# Patient Record
Sex: FEMALE | Race: WHITE | Employment: UNEMPLOYED
[De-identification: names, ages, dates, MRNs, and addresses within clinical notes are randomized per-mention and may not be internally consistent; named-entity substitution may affect disease eponyms.]

---
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
## 2023-01-03 ENCOUNTER — NURSE TRIAGE (OUTPATIENT)
Dept: OTHER | Facility: CLINIC | Age: 18
End: 2023-01-03

## 2023-01-03 NOTE — TELEPHONE ENCOUNTER
Location of patient: [place]    Subjective: Caller states \"Tachycardia\"     Current Symptoms: Tachycardia;  Blood pressure drops when she stands up;  Gets dizzy when standing up and has to hold a wall;  Nausea and vomiting for 6 months;  Denies blood; Chest pain for 6 years; Onset: 1 month ago;     Associated Symptoms: reduced activity    Pain Severity: 5/10; chronic joint pain;     Temperature: denies     What has been tried: Was having a physical today and was told that she needs to be seen in ED; Signed out AMA because she did not want to go in an ambulance; Recommended disposition: Go to ED Now    Care advice provided, patient verbalizes understanding; denies any other questions or concerns; instructed to call back for any new or worsening symptoms. Patient/caller agrees to proceed to the Emergency Department    This triage is a result of a call to [address] [place] [address]. Please do not respond to the triage nurse through this encounter. Any subsequent communication should be directly with the patient.     Reason for Disposition   Dizziness, lightheadedness, or weakness    Protocols used: Heart Rate and Heartbeat Questions-ADULT-[place]

## 2023-03-30 ENCOUNTER — TELEPHONE (OUTPATIENT)
Dept: CARDIOLOGY CLINIC | Age: 18
End: 2023-03-30

## 2023-03-30 NOTE — TELEPHONE ENCOUNTER
May be seen with any general cards if there is sooner.  If not please place patient on cancellation list as there is nothing sooner at this time

## 2023-03-30 NOTE — TELEPHONE ENCOUNTER
Patient was referred by Self to the [facility] location with Dr. Yuan Yip. Patient has been scheduled for 5/12/23 at 3 PM (am/pm). Patient verbalized understanding of appointment instructions. Call complete. Pt asking if they could be seen sooner. Getting second opinion on her Tach with Chest pain.

## 2023-03-31 NOTE — TELEPHONE ENCOUNTER
Pt is rs with [facility] ox 4/14 10:00 am Myalgia Monitoring: I explained this is common when taking isotretinoin. If this worsens they will contact us.

## 2023-04-14 ENCOUNTER — OFFICE VISIT (OUTPATIENT)
Dept: CARDIOLOGY CLINIC | Age: 18
End: 2023-04-14

## 2023-04-14 VITALS
OXYGEN SATURATION: 99 % | HEIGHT: 67 IN | WEIGHT: 204 LBS | SYSTOLIC BLOOD PRESSURE: 116 MMHG | BODY MASS INDEX: 32.02 KG/M2 | HEART RATE: 78 BPM | DIASTOLIC BLOOD PRESSURE: 70 MMHG

## 2023-04-14 DIAGNOSIS — R00.0 TACHYCARDIA: Primary | ICD-10-CM

## 2023-04-14 DIAGNOSIS — R00.2 PALPITATIONS: ICD-10-CM

## 2023-04-14 RX ORDER — VITAMIN B COMPLEX
1 CAPSULE ORAL DAILY
COMMUNITY

## 2023-04-14 RX ORDER — ALBUTEROL SULFATE 90 UG/1
2 AEROSOL, METERED RESPIRATORY (INHALATION) EVERY 6 HOURS PRN
COMMUNITY

## 2023-04-14 RX ORDER — DOXEPIN HYDROCHLORIDE 6 MG/1
TABLET ORAL
COMMUNITY
Start: 2023-03-15

## 2023-04-14 RX ORDER — MELOXICAM 15 MG/1
15 TABLET ORAL DAILY
COMMUNITY
Start: 2023-04-06

## 2023-04-14 RX ORDER — DICYCLOMINE HCL 20 MG
TABLET ORAL
COMMUNITY
Start: 2023-04-04

## 2023-04-14 RX ORDER — CARIPRAZINE 1.5 MG/1
CAPSULE, GELATIN COATED ORAL
COMMUNITY
Start: 2023-04-06

## 2023-04-14 RX ORDER — IPRATROPIUM BROMIDE AND ALBUTEROL SULFATE 2.5; .5 MG/3ML; MG/3ML
SOLUTION RESPIRATORY (INHALATION)
COMMUNITY
Start: 2023-03-17

## 2023-04-14 RX ORDER — PROPRANOLOL HYDROCHLORIDE 10 MG/1
TABLET ORAL
COMMUNITY
Start: 2023-04-04

## 2023-04-14 RX ORDER — UBIDECARENONE 50 MG
100 CAPSULE ORAL DAILY
COMMUNITY

## 2023-04-14 RX ORDER — DEXTROMETHORPHAN HYDROBROMIDE AND PROMETHAZINE HYDROCHLORIDE 15; 6.25 MG/5ML; MG/5ML
SYRUP ORAL
COMMUNITY
Start: 2023-04-04

## 2023-04-17 ENCOUNTER — TELEPHONE (OUTPATIENT)
Dept: CARDIOLOGY CLINIC | Age: 18
End: 2023-04-17

## 2023-04-17 NOTE — TELEPHONE ENCOUNTER
Pt called to request her test that were done at [facility]. Please call to discuss. Please advise     Pt is requesting a referral for an endocrinologist.  Please advise.   Thank you

## 2023-04-17 NOTE — TELEPHONE ENCOUNTER
I spoke w/ Michael Westgate and advised her that the allergist will have to make the referral. She states she is not seeing the allergist until later this month. Her pcp can make the referral, but she states he needs to know why the referral is being made. She states Jay Ward is requesting it be in  [employer] office note. I informed Oumar Yeung will notify Dr Teo Pearl of this.

## 2023-04-25 ENCOUNTER — TELEPHONE (OUTPATIENT)
Dept: CARDIOLOGY CLINIC | Age: 18
End: 2023-04-25

## 2023-05-02 NOTE — TELEPHONE ENCOUNTER
Pt called for lab results. Would like a call back to discuss and to also schedule a Follow up appt with DA. Please advise.

## 2023-12-11 ENCOUNTER — APPOINTMENT (OUTPATIENT)
Dept: OBSTETRICS AND GYNECOLOGY | Facility: CLINIC | Age: 18
End: 2023-12-11
Payer: COMMERCIAL

## 2024-06-13 ENCOUNTER — TELEPHONE (OUTPATIENT)
Dept: OBSTETRICS AND GYNECOLOGY | Facility: CLINIC | Age: 19
End: 2024-06-13
Payer: COMMERCIAL

## 2024-06-13 DIAGNOSIS — N91.2 AMENORRHEA: Primary | ICD-10-CM

## 2024-06-13 NOTE — TELEPHONE ENCOUNTER
Pt called to scheduled appt. Pt is unsure when her lmp was and has taken multiple pregnancy tests that have all came out negative. Pt needs orders for blood work and has appt set for 06/27

## 2024-06-14 ENCOUNTER — LAB (OUTPATIENT)
Dept: LAB | Facility: LAB | Age: 19
End: 2024-06-14
Payer: COMMERCIAL

## 2024-06-14 DIAGNOSIS — N91.2 AMENORRHEA: ICD-10-CM

## 2024-06-14 LAB — B-HCG SERPL-ACNC: <3 MIU/ML

## 2024-06-14 PROCEDURE — 84702 CHORIONIC GONADOTROPIN TEST: CPT

## 2024-06-14 PROCEDURE — 36415 COLL VENOUS BLD VENIPUNCTURE: CPT

## 2024-06-26 ENCOUNTER — TELEPHONE (OUTPATIENT)
Dept: OBSTETRICS AND GYNECOLOGY | Facility: CLINIC | Age: 19
End: 2024-06-26
Payer: COMMERCIAL

## 2024-06-27 ENCOUNTER — HOSPITAL ENCOUNTER (OUTPATIENT)
Dept: RADIOLOGY | Facility: EXTERNAL LOCATION | Age: 19
Discharge: HOME | End: 2024-06-27

## 2024-06-27 ENCOUNTER — OFFICE VISIT (OUTPATIENT)
Dept: OBSTETRICS AND GYNECOLOGY | Facility: CLINIC | Age: 19
End: 2024-06-27
Payer: COMMERCIAL

## 2024-06-27 ENCOUNTER — LAB (OUTPATIENT)
Dept: LAB | Facility: LAB | Age: 19
End: 2024-06-27
Payer: COMMERCIAL

## 2024-06-27 VITALS
BODY MASS INDEX: 38.07 KG/M2 | DIASTOLIC BLOOD PRESSURE: 90 MMHG | HEIGHT: 66 IN | SYSTOLIC BLOOD PRESSURE: 130 MMHG | WEIGHT: 236.9 LBS

## 2024-06-27 DIAGNOSIS — E28.2 PCOS (POLYCYSTIC OVARIAN SYNDROME): ICD-10-CM

## 2024-06-27 DIAGNOSIS — M25.531 WRIST PAIN, RIGHT: ICD-10-CM

## 2024-06-27 DIAGNOSIS — N93.9 ABNORMAL UTERINE BLEEDING (AUB): ICD-10-CM

## 2024-06-27 DIAGNOSIS — N91.2 AMENORRHEA: ICD-10-CM

## 2024-06-27 DIAGNOSIS — N93.9 ABNORMAL UTERINE BLEEDING (AUB): Primary | ICD-10-CM

## 2024-06-27 LAB
DHEA-S SERPL-MCNC: 399 UG/DL (ref 65–395)
FSH SERPL-ACNC: 6.6 IU/L
LH SERPL-ACNC: 6.5 IU/L
PROLACTIN SERPL-MCNC: 4 UG/L (ref 3–20)

## 2024-06-27 PROCEDURE — 83002 ASSAY OF GONADOTROPIN (LH): CPT

## 2024-06-27 PROCEDURE — 1036F TOBACCO NON-USER: CPT | Performed by: OBSTETRICS & GYNECOLOGY

## 2024-06-27 PROCEDURE — 36415 COLL VENOUS BLD VENIPUNCTURE: CPT

## 2024-06-27 PROCEDURE — 99204 OFFICE O/P NEW MOD 45 MIN: CPT | Performed by: OBSTETRICS & GYNECOLOGY

## 2024-06-27 PROCEDURE — 82627 DEHYDROEPIANDROSTERONE: CPT

## 2024-06-27 PROCEDURE — 99214 OFFICE O/P EST MOD 30 MIN: CPT | Performed by: OBSTETRICS & GYNECOLOGY

## 2024-06-27 PROCEDURE — 83001 ASSAY OF GONADOTROPIN (FSH): CPT

## 2024-06-27 PROCEDURE — 84402 ASSAY OF FREE TESTOSTERONE: CPT

## 2024-06-27 PROCEDURE — 83498 ASY HYDROXYPROGESTERONE 17-D: CPT

## 2024-06-27 PROCEDURE — 84146 ASSAY OF PROLACTIN: CPT

## 2024-06-27 ASSESSMENT — ENCOUNTER SYMPTOMS
DEPRESSION: 0
LOSS OF SENSATION IN FEET: 0
OCCASIONAL FEELINGS OF UNSTEADINESS: 0

## 2024-06-27 ASSESSMENT — PATIENT HEALTH QUESTIONNAIRE - PHQ9
2. FEELING DOWN, DEPRESSED OR HOPELESS: NOT AT ALL
SUM OF ALL RESPONSES TO PHQ9 QUESTIONS 1 AND 2: 0
1. LITTLE INTEREST OR PLEASURE IN DOING THINGS: NOT AT ALL

## 2024-06-27 ASSESSMENT — PAIN SCALES - GENERAL: PAINLEVEL: 0-NO PAIN

## 2024-06-27 NOTE — PROGRESS NOTES
Carey Jeong is a 18 y.o. female,  who presented with  AUB    Oligomenorrhea   No dysuria   No discharge   Acne  Hair growth    Was on OCP but get depressed so stopped them       Obstetrical History:  OB History          0    Para   0    Term   0       0    AB   0    Living   0         SAB   0    IAB   0    Ectopic   0    Multiple   0    Live Births   0                    Gynecological history:  Menarche: 11   years old   Periods irregular             Contraceptive history:  Contraception:    Currently using none            Vaginal discharge    No  Menopausal symptoms No        Last Pap:  History of abnormal pap exams? No       Past Medical History:   Diagnosis Date    Acute post-traumatic headache, not intractable 2019    Acute post-traumatic headache, not intractable    Bicipital tendinitis, right shoulder 2020    Biceps tendinitis of right upper extremity    Concussion without loss of consciousness, initial encounter 2019    Closed head injury with concussion, without loss of consciousness, initial encounter    Contusion of left lower leg, initial encounter 2019    Contusion of left lower leg, initial encounter    Contusion of left middle finger without damage to nail, initial encounter 2019    Contusion of left middle finger without damage to nail, initial encounter    Obesity, unspecified 2019    Obesity peds (BMI >=95 percentile)    Other specified conditions originating in the  period 2021    Heart murmur of     Pain in right shoulder 2020    Chronic right shoulder pain    Personal history of diseases of the skin and subcutaneous tissue 07/10/2017    History of eczema    Sprain of unspecified ligament of right ankle, initial encounter 07/10/2017    Right ankle sprain    Syncope and collapse 2021    Vasovagal near syncope    Unspecified injury of right ankle, initial encounter 07/10/2017    Injury of right ankle,  "initial encounter     History reviewed. No pertinent surgical history.  No family history on file.  Social History     Tobacco Use    Smoking status: Never    Smokeless tobacco: Never   Vaping Use    Vaping status: Never Used   Substance Use Topics    Alcohol use: Never    Drug use: Never     Social History     Tobacco Use   Smoking Status Never   Smokeless Tobacco Never       No current outpatient medications on file prior to visit.     No current facility-administered medications on file prior to visit.     Allergies   Allergen Reactions    Adhesive Tape-Silicones Hives     Band aids, halter monitor adhesives, etc.    Metoprolol Nausea/vomiting    Oxycodone GI Upset    Propofol Nausea/vomiting     Other reaction(s): Vomiting   Vomiting and memory loss    Vomiting and memory loss         REVIEW OF SYSTEMS:    General: No weight loss, malaise or fevers or other constitutional symptoms.  Neuro: No history of TIA's, stroke,.  No neurological symptoms or problems. No visual changes  Respiratory: No history of respiratory/pulmonary symptoms or problems.  Cardiovascular: No history of cardiovascular symptoms or problems.  GI: No history of GI symptoms or problems.   : No history of UTI in past 6 weeks.  No history of  symptoms or problems.  BREASTS: No skin dimpling, nipple discharge or masses.  Endocrine: No history of diabetes. No Thyroid symptoms  Hematology: No history of bleeding or clotting disorder.  Skin: Negative for lesions, rash, and itching.      PHYSICAL EXAMINATION:    /90   Ht 1.676 m (5' 6\")   Wt 107 kg (236 lb 14.4 oz)   LMP  (LMP Unknown)   BMI 38.24 kg/m²   GENERAL: pleasant, female in no apparent distress  HEENT: Normocephalic, atraumatic, mucus membranes moist and no lesions  NEURO: alert and oriented x3,exam grossly non-focal  NECK: Supple, full range of motion, no adenopathy and thyroid normal  DERMATOLOGY: Normal, without lesions, non-icteric and non-hirsute       ABDOMEN: soft, " non-tender and no masses  PELVIC:   uterus normal size, shape and consistency, no adnexal masses and non-tender        ASSESSMENT and Plan:    Carey Jeong is a 18 y.o. female,  who  presented with AUB, mostly PCOS     Plan is  - I  ordered transvaginal ultrasound    - I will also do a hormonal panel    - FU after labs and US           Yoselin Jones MD

## 2024-07-01 ENCOUNTER — TELEPHONE (OUTPATIENT)
Dept: OBSTETRICS AND GYNECOLOGY | Facility: CLINIC | Age: 19
End: 2024-07-01

## 2024-07-01 ENCOUNTER — HOSPITAL ENCOUNTER (OUTPATIENT)
Dept: RADIOLOGY | Facility: CLINIC | Age: 19
Discharge: HOME | End: 2024-07-01
Payer: COMMERCIAL

## 2024-07-01 ENCOUNTER — LAB (OUTPATIENT)
Dept: LAB | Facility: LAB | Age: 19
End: 2024-07-01
Payer: COMMERCIAL

## 2024-07-01 DIAGNOSIS — Z20.2 POSSIBLE EXPOSURE TO STD: Primary | ICD-10-CM

## 2024-07-01 DIAGNOSIS — N93.9 ABNORMAL UTERINE BLEEDING (AUB): ICD-10-CM

## 2024-07-01 DIAGNOSIS — E28.2 PCOS (POLYCYSTIC OVARIAN SYNDROME): ICD-10-CM

## 2024-07-01 DIAGNOSIS — Z20.2 POSSIBLE EXPOSURE TO STD: ICD-10-CM

## 2024-07-01 DIAGNOSIS — N91.2 AMENORRHEA: ICD-10-CM

## 2024-07-01 LAB
HBV SURFACE AG SERPL QL IA: NONREACTIVE
HCV AB SER QL: NONREACTIVE
HIV 1+2 AB+HIV1 P24 AG SERPL QL IA: NONREACTIVE
TREPONEMA PALLIDUM IGG+IGM AB [PRESENCE] IN SERUM OR PLASMA BY IMMUNOASSAY: NONREACTIVE

## 2024-07-01 PROCEDURE — 87491 CHLMYD TRACH DNA AMP PROBE: CPT

## 2024-07-01 PROCEDURE — 87661 TRICHOMONAS VAGINALIS AMPLIF: CPT

## 2024-07-01 PROCEDURE — 86803 HEPATITIS C AB TEST: CPT

## 2024-07-01 PROCEDURE — 87389 HIV-1 AG W/HIV-1&-2 AB AG IA: CPT

## 2024-07-01 PROCEDURE — 76856 US EXAM PELVIC COMPLETE: CPT

## 2024-07-01 PROCEDURE — 87340 HEPATITIS B SURFACE AG IA: CPT

## 2024-07-01 PROCEDURE — 87591 N.GONORRHOEAE DNA AMP PROB: CPT

## 2024-07-01 PROCEDURE — 86780 TREPONEMA PALLIDUM: CPT

## 2024-07-01 PROCEDURE — 36415 COLL VENOUS BLD VENIPUNCTURE: CPT

## 2024-07-01 NOTE — TELEPHONE ENCOUNTER
Pt called stating she's getting ultrasound done this morning, pt also asked if she could do an STD panel, stated she forgot to ask at her last appointment.

## 2024-07-02 LAB
17OHP SERPL-MCNC: 30.24 NG/DL
C TRACH RRNA SPEC QL NAA+PROBE: NEGATIVE
N GONORRHOEA DNA SPEC QL PROBE+SIG AMP: NEGATIVE
T VAGINALIS RRNA SPEC QL NAA+PROBE: NEGATIVE
TESTOSTERONE FREE (CHAN): 5.1 PG/ML (ref 0.1–6.4)
TESTOSTERONE,TOTAL,LC-MS/MS: 46 NG/DL (ref 2–45)

## 2024-07-18 ENCOUNTER — APPOINTMENT (OUTPATIENT)
Dept: OBSTETRICS AND GYNECOLOGY | Facility: CLINIC | Age: 19
End: 2024-07-18
Payer: COMMERCIAL

## 2024-08-19 ENCOUNTER — APPOINTMENT (OUTPATIENT)
Dept: OBSTETRICS AND GYNECOLOGY | Facility: CLINIC | Age: 19
End: 2024-08-19
Payer: COMMERCIAL

## 2024-08-19 VITALS — BODY MASS INDEX: 38.25 KG/M2 | WEIGHT: 237 LBS | DIASTOLIC BLOOD PRESSURE: 80 MMHG | SYSTOLIC BLOOD PRESSURE: 126 MMHG

## 2024-08-19 DIAGNOSIS — Z75.8 DOES NOT HAVE PRIMARY CARE PROVIDER: ICD-10-CM

## 2024-08-19 DIAGNOSIS — N92.6 IRREGULAR MENSES: ICD-10-CM

## 2024-08-19 DIAGNOSIS — E28.2 PCOS (POLYCYSTIC OVARIAN SYNDROME): Primary | ICD-10-CM

## 2024-08-19 PROCEDURE — 99213 OFFICE O/P EST LOW 20 MIN: CPT

## 2024-08-19 ASSESSMENT — ENCOUNTER SYMPTOMS
CONSTITUTIONAL NEGATIVE: 0
PSYCHIATRIC NEGATIVE: 0
EYES NEGATIVE: 0
HEMATOLOGIC/LYMPHATIC NEGATIVE: 0
ALLERGIC/IMMUNOLOGIC NEGATIVE: 0
ENDOCRINE NEGATIVE: 0
RESPIRATORY NEGATIVE: 0
CARDIOVASCULAR NEGATIVE: 0
NEUROLOGICAL NEGATIVE: 0
GASTROINTESTINAL NEGATIVE: 0
MUSCULOSKELETAL NEGATIVE: 0

## 2024-08-19 NOTE — PROGRESS NOTES
"Assessment/Plan   Diagnoses and all orders for this visit:  PCOS (polycystic ovarian syndrome)  Does not have primary care provider  -     Referral to Primary Care; Future  Irregular menses    We reviewed her lab results and her ultrasound report.   Discussed that her high levels of testosterone and her abnormal cycles put in the diagnostic criteria for PCOS. We discussed treatment options including use of contraceptives. Patient reports that she has been on OCPS before and that it did not work well. We discussed the importance of uterine protection and the risks of not menstruating regularly discussed medication management for uterine protection including LARCs, OCPs, POPs, Vaginal rings, patches. Patient disinterested in all medication management for AUB. Patient verbally upset about lack of alternative therapies. Encouraged patient to reach out if without cycle for 3 months. Highly encouraged use of provera if needed. Patient reports that she does not intend to do the medication. We dicussed the importance of annual well woman visits. Discussed pap indicated in one month time at age 19. Patient verbalizes that she has \"no intention\" of completing a pap test. Discussed PAPs and their indications and recommendations.     Encouraged patient to follow up with annual well woman     RICH Bruner-DEVONTE    Subjective   Carey Jeong is a 18 y.o. female who presents for concern for abnormal uterine bleeding.   Patient was seen recently on 6/27/24 for oligomenorrhea with acne and hair growth.   Patient reports that she was on an OCP but stopped them due to mood disturbance.   She reports tat she typically has regular periods every 5 weeks. Patient currently has not had a period since June   Patient reports that she has hirsutism. Patient has difficulties with weight loss  Patient has recent blood work for PCOS which resulted in high total testosterone.     Pelvic US 7/1/24- no worrisome abnormalities.   Labs 6/27/24- " high testosterone.     Objective   /80   Wt 108 kg (237 lb)   LMP 06/20/2024 (Approximate)   BMI 38.25 kg/m²   Patient declines physical exam.

## 2024-12-16 ENCOUNTER — LAB (OUTPATIENT)
Dept: LAB | Facility: LAB | Age: 19
End: 2024-12-16
Payer: COMMERCIAL

## 2024-12-16 ENCOUNTER — APPOINTMENT (OUTPATIENT)
Dept: OBSTETRICS AND GYNECOLOGY | Facility: CLINIC | Age: 19
End: 2024-12-16
Payer: COMMERCIAL

## 2024-12-16 ENCOUNTER — TELEPHONE (OUTPATIENT)
Dept: OBSTETRICS AND GYNECOLOGY | Facility: CLINIC | Age: 19
End: 2024-12-16

## 2024-12-16 VITALS — WEIGHT: 213 LBS | DIASTOLIC BLOOD PRESSURE: 78 MMHG | SYSTOLIC BLOOD PRESSURE: 118 MMHG | BODY MASS INDEX: 34.38 KG/M2

## 2024-12-16 DIAGNOSIS — N89.8 VAGINAL IRRITATION: ICD-10-CM

## 2024-12-16 DIAGNOSIS — Z30.44 ENCOUNTER FOR SURVEILLANCE OF VAGINAL RING HORMONAL CONTRACEPTIVE DEVICE: ICD-10-CM

## 2024-12-16 DIAGNOSIS — Z20.828 EXPOSURE TO HERPES SIMPLEX VIRUS (HSV): ICD-10-CM

## 2024-12-16 DIAGNOSIS — Z11.3 SCREEN FOR STD (SEXUALLY TRANSMITTED DISEASE): Primary | ICD-10-CM

## 2024-12-16 DIAGNOSIS — Z11.3 SCREEN FOR STD (SEXUALLY TRANSMITTED DISEASE): ICD-10-CM

## 2024-12-16 LAB
HBV SURFACE AG SERPL QL IA: NONREACTIVE
HCV AB SER QL: NONREACTIVE
HERPES SIMPLEX VIRUS 1 IGG: >8 INDEX
HERPES SIMPLEX VIRUS 2 IGG: 1.1 INDEX
HIV 1+2 AB+HIV1 P24 AG SERPL QL IA: NONREACTIVE
POC APPEARANCE, URINE: CLEAR
POC BILIRUBIN, URINE: NEGATIVE
POC BLOOD, URINE: NEGATIVE
POC COLOR, URINE: YELLOW
POC GLUCOSE, URINE: NEGATIVE MG/DL
POC KETONES, URINE: NEGATIVE MG/DL
POC LEUKOCYTES, URINE: ABNORMAL
POC NITRITE,URINE: NEGATIVE
POC PH, URINE: 7 PH
POC PROTEIN, URINE: NEGATIVE MG/DL
POC SPECIFIC GRAVITY, URINE: 1.01
POC UROBILINOGEN, URINE: 0.2 EU/DL
TREPONEMA PALLIDUM IGG+IGM AB [PRESENCE] IN SERUM OR PLASMA BY IMMUNOASSAY: NONREACTIVE

## 2024-12-16 PROCEDURE — 87591 N.GONORRHOEAE DNA AMP PROB: CPT

## 2024-12-16 PROCEDURE — 36415 COLL VENOUS BLD VENIPUNCTURE: CPT

## 2024-12-16 PROCEDURE — 81003 URINALYSIS AUTO W/O SCOPE: CPT | Performed by: ADVANCED PRACTICE MIDWIFE

## 2024-12-16 PROCEDURE — 99213 OFFICE O/P EST LOW 20 MIN: CPT | Performed by: ADVANCED PRACTICE MIDWIFE

## 2024-12-16 PROCEDURE — 87205 SMEAR GRAM STAIN: CPT

## 2024-12-16 PROCEDURE — 86780 TREPONEMA PALLIDUM: CPT

## 2024-12-16 PROCEDURE — 87389 HIV-1 AG W/HIV-1&-2 AB AG IA: CPT

## 2024-12-16 PROCEDURE — 87340 HEPATITIS B SURFACE AG IA: CPT

## 2024-12-16 PROCEDURE — 87491 CHLMYD TRACH DNA AMP PROBE: CPT

## 2024-12-16 PROCEDURE — 86803 HEPATITIS C AB TEST: CPT

## 2024-12-16 PROCEDURE — 87661 TRICHOMONAS VAGINALIS AMPLIF: CPT

## 2024-12-16 PROCEDURE — 86695 HERPES SIMPLEX TYPE 1 TEST: CPT

## 2024-12-16 PROCEDURE — 86696 HERPES SIMPLEX TYPE 2 TEST: CPT

## 2024-12-16 RX ORDER — ETONOGESTREL AND ETHINYL ESTRADIOL VAGINAL RING .015; .12 MG/D; MG/D
1 RING VAGINAL
Qty: 3 EACH | Refills: 3 | Status: SHIPPED | OUTPATIENT
Start: 2024-12-16 | End: 2025-12-16

## 2024-12-16 RX ORDER — TERCONAZOLE 8 MG/G
1 CREAM VAGINAL NIGHTLY
Qty: 1 G | Refills: 0 | Status: SHIPPED | OUTPATIENT
Start: 2024-12-16 | End: 2024-12-19

## 2024-12-16 ASSESSMENT — ENCOUNTER SYMPTOMS
CARDIOVASCULAR NEGATIVE: 0
ENDOCRINE NEGATIVE: 0
EYES NEGATIVE: 0
GASTROINTESTINAL NEGATIVE: 0
NEUROLOGICAL NEGATIVE: 0
CONSTITUTIONAL NEGATIVE: 0
PSYCHIATRIC NEGATIVE: 0
RESPIRATORY NEGATIVE: 0
ALLERGIC/IMMUNOLOGIC NEGATIVE: 0
HEMATOLOGIC/LYMPHATIC NEGATIVE: 0
MUSCULOSKELETAL NEGATIVE: 0

## 2024-12-16 NOTE — TELEPHONE ENCOUNTER
Called pt, no answer, left voicemail for return call  Yanni wants to know which bc does she want pill or nuvaring

## 2024-12-16 NOTE — PROGRESS NOTES
Subjective   Patient ID: Carey Jeong is a 19 y.o. female who presents for STI Screening (FU for sti testing. Go back on birth control nuvaring /Ambrosio MADDOX /).  HPI  Wants to start Progestin only contraception    Here reporting vaginal irritation x several weeks    Desires STI screening  New partner x 1 month  Partner has HSV oral and genital lesions  Patient unable to recall how many life time partners    Patient has had one oral lesion in her mouth- long  ago    Review of Systems   Genitourinary:  Positive for genital sores, pelvic pain and vaginal pain.        Vaginal irritation       Objective   Physical Exam  Genitourinary:     Pubic Area: Rash present.      Labia:         Right: Tenderness present.         Left: Tenderness present.       Vagina: Erythema and tenderness present.          Comments: Declines internal exam  Slight abrasion at posterior fourchette        Assessment/Plan   Problem List Items Addressed This Visit    None  Visit Diagnoses         Codes    Screen for STD (sexually transmitted disease)    -  Primary Z11.3    Relevant Orders    C. trachomatis / N. gonorrhoeae, Amplified    HIV 1/2 Antigen/Antibody Screen with Reflex to Confirmation    Hepatitis B Surface Antigen    Hepatitis C Antibody    Syphilis Screen with Reflex    Trichomonas vaginalis, Amplified    Exposure to herpes simplex virus (HSV)     Z20.828    Relevant Orders    HSV1 IgG and HSV2 IgG    Vaginal irritation     N89.8    Relevant Medications    terconazole (Terazol 3) 0.8 % vaginal cream    Other Relevant Orders    Vaginitis Gram Stain For Bacterial Vaginosis + Yeast        Declines pap wishes to defer until 23 discussed risks, benefits and alternatives      Discussed options for progestin only contraception    NAIMA Gay 12/16/24 11:36 AM Patient was identified as a fall risk. Risk prevention instructions provided.

## 2024-12-17 LAB
C TRACH RRNA SPEC QL NAA+PROBE: NEGATIVE
CLUE CELLS VAG LPF-#/AREA: ABNORMAL /[LPF]
N GONORRHOEA DNA SPEC QL PROBE+SIG AMP: NEGATIVE
NUGENT SCORE: 0
T VAGINALIS RRNA SPEC QL NAA+PROBE: NEGATIVE
YEAST VAG WET PREP-#/AREA: PRESENT

## 2025-01-07 ENCOUNTER — APPOINTMENT (OUTPATIENT)
Dept: OBSTETRICS AND GYNECOLOGY | Facility: CLINIC | Age: 20
End: 2025-01-07
Payer: COMMERCIAL

## 2025-03-25 ENCOUNTER — APPOINTMENT (OUTPATIENT)
Dept: OTOLARYNGOLOGY | Facility: CLINIC | Age: 20
End: 2025-03-25
Payer: COMMERCIAL

## 2025-03-25 VITALS — WEIGHT: 202 LBS | BODY MASS INDEX: 32.47 KG/M2 | HEIGHT: 66 IN

## 2025-03-25 DIAGNOSIS — J34.2 DEVIATED NASAL SEPTUM: ICD-10-CM

## 2025-03-25 DIAGNOSIS — J34.3 HYPERTROPHY OF BOTH INFERIOR NASAL TURBINATES: ICD-10-CM

## 2025-03-25 DIAGNOSIS — J30.2 SEASONAL AND PERENNIAL ALLERGIC RHINITIS: ICD-10-CM

## 2025-03-25 DIAGNOSIS — J32.9 RECURRENT SINUSITIS: Primary | ICD-10-CM

## 2025-03-25 DIAGNOSIS — J30.89 SEASONAL AND PERENNIAL ALLERGIC RHINITIS: ICD-10-CM

## 2025-03-25 PROCEDURE — 99213 OFFICE O/P EST LOW 20 MIN: CPT | Performed by: OTOLARYNGOLOGY

## 2025-03-25 PROCEDURE — 1036F TOBACCO NON-USER: CPT | Performed by: OTOLARYNGOLOGY

## 2025-03-25 PROCEDURE — 3008F BODY MASS INDEX DOCD: CPT | Performed by: OTOLARYNGOLOGY

## 2025-03-25 NOTE — PROGRESS NOTES
Chief Complaint   Patient presents with    New Patient Visit     NP- SINUS ISSUES     HPI:  Carey Jeong is a 19 y.o. female who presents today for evaluation of her sinuses and recurrent sinus infections.  She states she gets recurrent infections about 4-6 times a year over the past 2 to 3 years.  Symptoms are facial pressure and pain, drainage, congestion, and ear pressure.  Symptoms last a few weeks.  She does have a known history to dust allergy.  She does use Claritin.  Occasionally when she is sick she will use Flonase.    PMH:  Past Medical History:   Diagnosis Date    Acute post-traumatic headache, not intractable 2019    Acute post-traumatic headache, not intractable    Bicipital tendinitis, right shoulder 2020    Biceps tendinitis of right upper extremity    Concussion without loss of consciousness, initial encounter 2019    Closed head injury with concussion, without loss of consciousness, initial encounter    Contusion of left lower leg, initial encounter 2019    Contusion of left lower leg, initial encounter    Contusion of left middle finger without damage to nail, initial encounter 2019    Contusion of left middle finger without damage to nail, initial encounter    Obesity, unspecified 2019    Obesity peds (BMI >=95 percentile)    Other specified conditions originating in the  period 2021    Heart murmur of     Pain in right shoulder 2020    Chronic right shoulder pain    Personal history of diseases of the skin and subcutaneous tissue 07/10/2017    History of eczema    Sprain of unspecified ligament of right ankle, initial encounter 07/10/2017    Right ankle sprain    Syncope and collapse 2021    Vasovagal near syncope    Unspecified injury of right ankle, initial encounter 07/10/2017    Injury of right ankle, initial encounter     History reviewed. No pertinent surgical history.      Medications:     Current Outpatient Medications:  "    etonogestreL-ethinyl estradioL (Nuvaring) 0.12-0.015 mg/24 hr vaginal ring, Insert 1 each into the vagina every 28 (twenty-eight) days. Insert vaginal ring for 3 weeks, then remove for 1 week., Disp: 3 each, Rfl: 3     Allergies:  Allergies   Allergen Reactions    Adhesive Tape-Silicones Hives     Band aids, halter monitor adhesives, etc.    Metoprolol Nausea/vomiting    Oxycodone GI Upset    Propofol Nausea/vomiting     Other reaction(s): Vomiting   Vomiting and memory loss    Vomiting and memory loss        ROS:  Review of systems normal unless stated otherwise in the HPI and/or PMH.    Physical Exam:  Height 1.676 m (5' 6\"), weight 91.6 kg (202 lb). Body mass index is 32.6 kg/m².     GENERAL APPEARANCE: Well developed and well nourished.  Alert and oriented in no acute distress.  Normal vocal quality.      HEAD/FACE: No erythema or edema or facial tenderness.  Normal facial nerve function bilaterally.    EAR:       EXTERNAL: Normal pinnas and external auditory canals without lesion or obstructing wax.       MIDDLE EAR: Tympanic membranes intact and mobile with normal landmarks.  Middle ear space appears well aerated.       TUBE STATUS: N/A       MASTOID CAVITY: N/A       HEARING: Gross hearing assessment is within normal limits.      NOSE:       VISUALIZED USING: Anterior rhinoscopy with headlight and nasal speculum.       DORSUM: Midline, nontraumatic appearance.       MUCOSA: Normal-appearing.       SECRETIONS: Normal.       SEPTUM: Mild deviation       INFERIOR TURBINATES: Bilateral hypertrophy       MIDDLE TURBINATES/MEATUS: N/A       BLEEDING: N/A         ORAL CAVITY/PHARYNX:       TEETH: Adequate dentition.       TONGUE: No mass or lesion.  Normal mobility.       FLOOR OF MOUTH: No mass or lesion.       PALATE: Normal hard palate, soft palate, and uvula.       OROPHARYNX: Normal without mass or lesion.       BUCCAL MUCOSA/GBS: Normal without mass or lesion.       LIPS: " Normal.    LARYNX/HYPOPHARYNX/NASOPHARYNX: N/A    NECK: No palpable masses or abnormal adenopathy.  Trachea is midline.    THYROID: No thyromegaly or palpable nodule.    SALIVARY GLANDS: Normal bilateral parotid and submandibular glands by inspection and palpation.    TMJ's: Normal.    NEURO: Cranial nerve exam grossly normal bilaterally.       Assessment/Plan   Carey was seen today for new patient visit.  Diagnoses and all orders for this visit:  Recurrent sinusitis (Primary)  -     CT sinus wo IV contrast; Future  Deviated nasal septum  Hypertrophy of both inferior nasal turbinates  Seasonal and perennial allergic rhinitis     Having some recurrent sinus infections.  Recommend CT imaging to make sure no anatomic consideration which would be amenable to surgical therapy.  I think her allergies are probably playing a significant role.  My guess is she has environmental allergies more than just to dust.  We did go over treatment options including avoidance measures, medications, and immunotherapy.  Recommend she use Flonase on a daily basis.  Follow up for test results after testing is complete.     Omid Frankel MD

## 2025-04-09 ENCOUNTER — HOSPITAL ENCOUNTER (OUTPATIENT)
Dept: RADIOLOGY | Facility: HOSPITAL | Age: 20
Discharge: HOME | End: 2025-04-09
Payer: COMMERCIAL

## 2025-04-09 DIAGNOSIS — J32.9 RECURRENT SINUSITIS: ICD-10-CM

## 2025-04-09 PROCEDURE — 70486 CT MAXILLOFACIAL W/O DYE: CPT

## 2025-04-24 ENCOUNTER — OFFICE VISIT (OUTPATIENT)
Facility: CLINIC | Age: 20
End: 2025-04-24
Payer: COMMERCIAL

## 2025-04-24 VITALS
BODY MASS INDEX: 33.88 KG/M2 | SYSTOLIC BLOOD PRESSURE: 114 MMHG | WEIGHT: 210.8 LBS | DIASTOLIC BLOOD PRESSURE: 78 MMHG | HEIGHT: 66 IN

## 2025-04-24 DIAGNOSIS — Z11.3 SCREEN FOR STD (SEXUALLY TRANSMITTED DISEASE): Primary | ICD-10-CM

## 2025-04-24 PROCEDURE — 99213 OFFICE O/P EST LOW 20 MIN: CPT | Performed by: ADVANCED PRACTICE MIDWIFE

## 2025-04-24 PROCEDURE — 3008F BODY MASS INDEX DOCD: CPT | Performed by: ADVANCED PRACTICE MIDWIFE

## 2025-04-24 ASSESSMENT — ENCOUNTER SYMPTOMS
DEPRESSION: 0
OCCASIONAL FEELINGS OF UNSTEADINESS: 0
LOSS OF SENSATION IN FEET: 0

## 2025-04-24 ASSESSMENT — PAIN SCALES - GENERAL: PAINLEVEL_OUTOF10: 0-NO PAIN

## 2025-04-24 ASSESSMENT — PATIENT HEALTH QUESTIONNAIRE - PHQ9
SUM OF ALL RESPONSES TO PHQ9 QUESTIONS 1 AND 2: 0
1. LITTLE INTEREST OR PLEASURE IN DOING THINGS: NOT AT ALL
2. FEELING DOWN, DEPRESSED OR HOPELESS: NOT AT ALL

## 2025-04-24 NOTE — PROGRESS NOTES
Subjective   Carey Jeong is a 19 y.o. who presents for sexually transmitted infection screening. Sexual history reviewed with the patient. STI exposures include none. Patient reports previous history of the following STIs: none. Current symptoms include none.  Desires routine testing.     Social History     Substance and Sexual Activity   Sexual Activity Yes    Partners: Choose not to disclose    Birth control/protection: Condom Male    Comment: pt. refused to answer     E-cigarette/Vaping       Questions Responses    E-cigarette/Vaping Use Former User            Objective   Physical Exam  A&O x 3  Pleasant and cooperative  Respirations even and unlabored  Assessment/Plan   Problem List Items Addressed This Visit    None  Visit Diagnoses         Codes      Screen for STD (sexually transmitted disease)    -  Primary Z11.3    Relevant Orders    C. trachomatis / N. gonorrhoeae, Amplified, Urogenital    HIV 1/2 Antigen/Antibody Screen with Reflex to Confirmation    Hepatitis B Surface Antigen    Hepatitis C Antibody    Syphilis Screen with Reflex    Trichomonas vaginalis, Amplified        .

## 2025-04-25 LAB
C TRACH RRNA SPEC QL NAA+PROBE: NOT DETECTED
C TRACH RRNA SPEC QL NAA+PROBE: NOT DETECTED
HBV SURFACE AG SERPL QL IA: NORMAL
HCV AB SERPL QL IA: NORMAL
N GONORRHOEA RRNA SPEC QL NAA+PROBE: NOT DETECTED
N GONORRHOEA RRNA SPEC QL NAA+PROBE: NOT DETECTED
QUEST GC CT AMPLIFIED (ALWAYS MESSAGE): NORMAL
QUEST GC CT AMPLIFIED (ALWAYS MESSAGE): NORMAL
T PALLIDUM AB SER QL IA: NORMAL
T VAGINALIS RRNA SPEC QL NAA+PROBE: NOT DETECTED
T VAGINALIS RRNA SPEC QL NAA+PROBE: NOT DETECTED

## 2025-04-29 LAB
HBV SURFACE AG SERPL QL IA: NORMAL
HCV AB SERPL QL IA: NORMAL
T PALLIDUM AB SER QL IA: NEGATIVE

## 2025-05-20 ENCOUNTER — OFFICE VISIT (OUTPATIENT)
Dept: PRIMARY CARE | Facility: CLINIC | Age: 20
End: 2025-05-20
Payer: COMMERCIAL

## 2025-05-20 VITALS
SYSTOLIC BLOOD PRESSURE: 110 MMHG | DIASTOLIC BLOOD PRESSURE: 72 MMHG | HEIGHT: 66 IN | BODY MASS INDEX: 34.23 KG/M2 | WEIGHT: 213 LBS | HEART RATE: 72 BPM | OXYGEN SATURATION: 98 % | TEMPERATURE: 96.8 F

## 2025-05-20 DIAGNOSIS — Z13.220 SCREENING FOR HYPERLIPIDEMIA: ICD-10-CM

## 2025-05-20 DIAGNOSIS — B00.9 HSV (HERPES SIMPLEX VIRUS) INFECTION: Primary | ICD-10-CM

## 2025-05-20 DIAGNOSIS — F31.11: ICD-10-CM

## 2025-05-20 DIAGNOSIS — Z79.899 ENCOUNTER FOR MEDICATION MANAGEMENT: ICD-10-CM

## 2025-05-20 DIAGNOSIS — D89.40 MAST CELL ACTIVATION, UNSPECIFIED (MULTI): ICD-10-CM

## 2025-05-20 DIAGNOSIS — F33.2 MAJOR DEPRESSIVE DISORDER, RECURRENT SEVERE WITHOUT PSYCHOTIC FEATURES (MULTI): ICD-10-CM

## 2025-05-20 DIAGNOSIS — Q79.60 EDS (EHLERS-DANLOS SYNDROME) (HHS-HCC): ICD-10-CM

## 2025-05-20 LAB — PREGNANCY TEST URINE, POC: NEGATIVE

## 2025-05-20 PROCEDURE — 3008F BODY MASS INDEX DOCD: CPT | Performed by: PHYSICIAN ASSISTANT

## 2025-05-20 PROCEDURE — 99203 OFFICE O/P NEW LOW 30 MIN: CPT | Performed by: PHYSICIAN ASSISTANT

## 2025-05-20 PROCEDURE — 81025 URINE PREGNANCY TEST: CPT | Performed by: PHYSICIAN ASSISTANT

## 2025-05-20 RX ORDER — VALACYCLOVIR HYDROCHLORIDE 500 MG/1
500 TABLET, FILM COATED ORAL DAILY
Qty: 90 TABLET | Refills: 1 | Status: SHIPPED | OUTPATIENT
Start: 2025-05-20 | End: 2025-11-16

## 2025-05-20 RX ORDER — VALACYCLOVIR HYDROCHLORIDE 500 MG/1
500 TABLET, FILM COATED ORAL DAILY
Qty: 30 TABLET | Refills: 0 | Status: SHIPPED | OUTPATIENT
Start: 2025-05-20 | End: 2025-11-16

## 2025-05-20 RX ORDER — FAMOTIDINE 20 MG/1
TABLET, FILM COATED ORAL
COMMUNITY

## 2025-05-20 ASSESSMENT — COLUMBIA-SUICIDE SEVERITY RATING SCALE - C-SSRS
1. IN THE PAST MONTH, HAVE YOU WISHED YOU WERE DEAD OR WISHED YOU COULD GO TO SLEEP AND NOT WAKE UP?: NO
2. HAVE YOU ACTUALLY HAD ANY THOUGHTS OF KILLING YOURSELF?: NO
6. HAVE YOU EVER DONE ANYTHING, STARTED TO DO ANYTHING, OR PREPARED TO DO ANYTHING TO END YOUR LIFE?: NO

## 2025-05-20 ASSESSMENT — ENCOUNTER SYMPTOMS
HEMATOLOGIC/LYMPHATIC NEGATIVE: 1
CARDIOVASCULAR NEGATIVE: 1
OCCASIONAL FEELINGS OF UNSTEADINESS: 0
MUSCULOSKELETAL NEGATIVE: 1
PSYCHIATRIC NEGATIVE: 1
LOSS OF SENSATION IN FEET: 0
NEUROLOGICAL NEGATIVE: 1
CONSTITUTIONAL NEGATIVE: 1
COLOR CHANGE: 0
WOUND: 1
EYES NEGATIVE: 1
RESPIRATORY NEGATIVE: 1
DEPRESSION: 0

## 2025-05-20 ASSESSMENT — PAIN SCALES - GENERAL: PAINLEVEL_OUTOF10: 0-NO PAIN

## 2025-05-20 NOTE — PROGRESS NOTES
"Subjective     Patient ID: Carey Jeong is a 19 y.o. female who presents for Establish Care.    HPI  Carey Jeong is seen for her chronic issues.    Pt is new to me today. 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 She reports she researched it and found that once she gets that infection its more likely to happen again and as such would like to start a suppressive regimen of valtrex. She denies any prior episodes. She denies any lip/oral/vaginal outbreaks. She does have EDS.  She is aware of risks of daily use of valtrex including but not limited to liver damage, pregnancy complications, medication interaction, etc.    She wishes to start the medication, I will send it in but would prefer a complete set of labs in six weeks given PMH of transaminates (she reports it possible was 2/2 to mental health meds she was on) . She is agreeable to this.     Review of Systems   Constitutional: Negative.    HENT: Negative.     Eyes: Negative.    Respiratory: Negative.     Cardiovascular: Negative.    Musculoskeletal: Negative.    Skin:  Positive for wound (Healed/resolved). Negative for color change, pallor and rash.   Neurological: Negative.    Hematological: Negative.    Psychiatric/Behavioral: Negative.         Objective  Vitals:  /72   Pulse 72   Temp 36 °C (96.8 °F)   Ht 1.676 m (5' 6\")   Wt 96.6 kg (213 lb)   LMP 02/20/2025 (Exact Date)   SpO2 98%   BMI 34.38 kg/m²     Physical Exam  Vitals reviewed.   Constitutional:       General: She is not in acute distress.     Appearance: Normal appearance. She is obese. She is not ill-appearing, toxic-appearing or diaphoretic.   HENT:      Head: Normocephalic.      Right Ear: External ear normal.      Left Ear: External ear normal.      Nose: Nose normal.      Mouth/Throat:      Mouth: Mucous membranes are moist.      Pharynx: No oropharyngeal exudate.      Comments: No lesion on lips    Eyes:      General:         Right " eye: No discharge.         Left eye: No discharge.      Extraocular Movements: Extraocular movements intact.      Pupils: Pupils are equal, round, and reactive to light.   Cardiovascular:      Rate and Rhythm: Normal rate and regular rhythm.      Pulses: Normal pulses.      Heart sounds: Normal heart sounds.   Pulmonary:      Effort: Pulmonary effort is normal.      Breath sounds: Normal breath sounds.   Musculoskeletal:         General: No swelling, tenderness, deformity or signs of injury. Normal range of motion.      Cervical back: Normal range of motion.      Right lower leg: No edema.      Left lower leg: No edema.   Skin:     General: Skin is warm.      Capillary Refill: Capillary refill takes less than 2 seconds.      Coloration: Skin is not jaundiced or pale.      Findings: No bruising, erythema, lesion or rash.   Neurological:      General: No focal deficit present.      Mental Status: She is alert and oriented to person, place, and time.   Psychiatric:         Mood and Affect: Mood normal.         Behavior: Behavior normal.         Thought Content: Thought content normal.         Judgment: Judgment normal.       Urine pregnancy: Negative  HSV1 IgG and HSV2 IgG  Order: [id number]   Status: Final result       Dx: Exposure to herpes simplex virus (HSV)    Test Result Released: Yes (seen)       Messages: Seen    0 Result Notes       1 Patient Communication      Component  Ref Range & Units 5 mo ago   HSV 1, IgG  <0.9 INDEX >8.0 High    Comment: NEGATIVE <0.9  EQUIVOCAL >=0.90 <=1.10  POSITIVE >1.10   HSV 2, IgG  <0.9 INDEX 1.1 High    Comment: NEGATIVE <0.9  EQUIVOCAL >=0.90 <=1.10  POSITIVE >1.10   Resulting Agency [facility]               Contains abnormal data CMP (BAMP,TP,ALB,TBIL,ALK,AST,ALT)  Order: [id number]  Component  Ref Range & Units 1 yr ago   SODIUM  135 - 145 mEq/L 139   POTASSIUM  3.6 - 5.1 mEq/L 4.4   CHLORIDE  98 - 111 mEq/L 102   CO2  21 - 31 mmol/L 26   GLUCOSE, RANDOM  70 - 99 mg/dL 101 High     BLD UREA NITROGEN  8 - 26 mg/dL 9   CREATININE  0.60 - 1.20 mg/dL 0.54 Low    CALCIUM  8.5 - 10.4 mg/dL 9.5   TOTAL PROTEIN  6.4 - 8.3 g/dL 7.5   ALBUMIN  3.5 - 5.2 g/dL 4.4   TOTAL BILIRUBIN  0.0 - 1.2 mg/dL 0.4   ALK PHOSPHATASE  36 - 123 IU/L 93   AST  10 - 40 IU/L 96 High    ALT  10 - 40 IU/L 199 High    ESTIMATED GFR  >59 mL/min/1.73 m2 134   Comment: Estimated GFR was calculated using the CKD-EPI cr (2021) equation refit without race.  The equation is recommended by the National Kidney Foundation - American Society of Nephrology Task Force.   ANION GAP  4 - 16 mmol/L 11   Comment: Tested at [facility],[address] [place] [address],[facility]   Resulting Agency [facility]     Specimen Collected: 05/02/24 11:38    Performed by: [facility] [employer] [facility] Last Resulted: 05/02/24 12:42   Received From: [employer] [facility]  Result Received: 06/14/24 12:43    Vie  Narrative  Performed by: [facility]  POTENTIAL FOR CROSS-REACTIVITY BETWEEN  HSV I AND HSV II EXISTS.       1. HSV (herpes simplex virus) infection  POCT Pregnancy, Urine manually resulted   See HPI. Urine pregnancy negative. Pt aware of r/b/ae of suppressive regimen of valtrex.  Comprehensive metabolic panel    valACYclovir (Valtrex) 500 mg tablet    CBC and Auto Differential    Tsh With Reflex To Free T4 If Abnormal    valACYclovir (Valtrex) 500 mg tablet      2. Screening for hyperlipidemia  Lipid panel      3. Encounter for medication management              Fasting labs in six weeks, then return for yearly physical    Sooner as needed

## 2025-06-27 LAB
ALBUMIN SERPL-MCNC: 4.6 G/DL (ref 3.6–5.1)
ALP SERPL-CCNC: 67 U/L (ref 31–125)
ALT SERPL-CCNC: 20 U/L (ref 6–29)
ANION GAP SERPL CALCULATED.4IONS-SCNC: 10 MMOL/L (CALC) (ref 7–17)
AST SERPL-CCNC: 20 U/L (ref 10–30)
BASOPHILS # BLD AUTO: 49 CELLS/UL (ref 0–200)
BASOPHILS NFR BLD AUTO: 0.5 %
BILIRUB SERPL-MCNC: 0.4 MG/DL (ref 0.2–1.2)
BUN SERPL-MCNC: 17 MG/DL (ref 7–25)
CALCIUM SERPL-MCNC: 9.1 MG/DL (ref 8.6–10.2)
CHLORIDE SERPL-SCNC: 101 MMOL/L (ref 98–110)
CHOLEST SERPL-MCNC: 171 MG/DL
CHOLEST/HDLC SERPL: 2.8 (CALC)
CO2 SERPL-SCNC: 27 MMOL/L (ref 20–32)
CREAT SERPL-MCNC: 0.7 MG/DL (ref 0.5–0.96)
EGFRCR SERPLBLD CKD-EPI 2021: 126 ML/MIN/1.73M2
EOSINOPHIL # BLD AUTO: 78 CELLS/UL (ref 15–500)
EOSINOPHIL NFR BLD AUTO: 0.8 %
ERYTHROCYTE [DISTWIDTH] IN BLOOD BY AUTOMATED COUNT: 13.5 % (ref 11–15)
GLUCOSE SERPL-MCNC: 84 MG/DL (ref 65–99)
HCT VFR BLD AUTO: 45 % (ref 35–45)
HDLC SERPL-MCNC: 62 MG/DL
HGB BLD-MCNC: 14.6 G/DL (ref 11.7–15.5)
LDLC SERPL CALC-MCNC: 91 MG/DL (CALC)
LYMPHOCYTES # BLD AUTO: 3107 CELLS/UL (ref 850–3900)
LYMPHOCYTES NFR BLD AUTO: 31.7 %
MCH RBC QN AUTO: 28.9 PG (ref 27–33)
MCHC RBC AUTO-ENTMCNC: 32.4 G/DL (ref 32–36)
MCV RBC AUTO: 88.9 FL (ref 80–100)
MONOCYTES # BLD AUTO: 1147 CELLS/UL (ref 200–950)
MONOCYTES NFR BLD AUTO: 11.7 %
NEUTROPHILS # BLD AUTO: 5419 CELLS/UL (ref 1500–7800)
NEUTROPHILS NFR BLD AUTO: 55.3 %
NONHDLC SERPL-MCNC: 109 MG/DL (CALC)
PLATELET # BLD AUTO: 301 THOUSAND/UL (ref 140–400)
PMV BLD REES-ECKER: 10.4 FL (ref 7.5–12.5)
POTASSIUM SERPL-SCNC: 4.4 MMOL/L (ref 3.5–5.3)
PROT SERPL-MCNC: 7.2 G/DL (ref 6.1–8.1)
RBC # BLD AUTO: 5.06 MILLION/UL (ref 3.8–5.1)
SODIUM SERPL-SCNC: 138 MMOL/L (ref 135–146)
TRIGL SERPL-MCNC: 89 MG/DL
TSH SERPL-ACNC: 1.04 MIU/L
WBC # BLD AUTO: 9.8 THOUSAND/UL (ref 3.8–10.8)

## 2025-07-01 ENCOUNTER — APPOINTMENT (OUTPATIENT)
Dept: PRIMARY CARE | Facility: CLINIC | Age: 20
End: 2025-07-01
Payer: COMMERCIAL

## 2025-07-01 DIAGNOSIS — Z13.220 SCREENING FOR HYPERLIPIDEMIA: ICD-10-CM

## 2025-07-01 DIAGNOSIS — B00.9 HSV (HERPES SIMPLEX VIRUS) INFECTION: ICD-10-CM

## 2025-07-03 ASSESSMENT — PROMIS GLOBAL HEALTH SCALE
RATE_AVERAGE_PAIN: 2
RATE_QUALITY_OF_LIFE: GOOD
RATE_MENTAL_HEALTH: FAIR
RATE_SOCIAL_SATISFACTION: GOOD
EMOTIONAL_PROBLEMS: SOMETIMES
RATE_AVERAGE_FATIGUE: MODERATE
RATE_PHYSICAL_HEALTH: FAIR
CARRYOUT_PHYSICAL_ACTIVITIES: MOSTLY
CARRYOUT_SOCIAL_ACTIVITIES: EXCELLENT
RATE_GENERAL_HEALTH: FAIR

## 2025-07-10 ENCOUNTER — APPOINTMENT (OUTPATIENT)
Dept: PRIMARY CARE | Facility: CLINIC | Age: 20
End: 2025-07-10
Payer: COMMERCIAL

## 2025-07-10 VITALS
BODY MASS INDEX: 34.55 KG/M2 | HEIGHT: 66 IN | TEMPERATURE: 97.4 F | WEIGHT: 215 LBS | DIASTOLIC BLOOD PRESSURE: 74 MMHG | HEART RATE: 94 BPM | OXYGEN SATURATION: 97 % | SYSTOLIC BLOOD PRESSURE: 120 MMHG

## 2025-07-10 DIAGNOSIS — F33.2 MAJOR DEPRESSIVE DISORDER, RECURRENT SEVERE WITHOUT PSYCHOTIC FEATURES (MULTI): ICD-10-CM

## 2025-07-10 DIAGNOSIS — R10.11 RUQ PAIN: ICD-10-CM

## 2025-07-10 DIAGNOSIS — Z00.00 ANNUAL PHYSICAL EXAM: Primary | ICD-10-CM

## 2025-07-10 DIAGNOSIS — Q79.60 EDS (EHLERS-DANLOS SYNDROME) (HHS-HCC): ICD-10-CM

## 2025-07-10 DIAGNOSIS — J45.909 ASTHMA, UNSPECIFIED ASTHMA SEVERITY, UNSPECIFIED WHETHER COMPLICATED, UNSPECIFIED WHETHER PERSISTENT (HHS-HCC): ICD-10-CM

## 2025-07-10 DIAGNOSIS — F31.11: ICD-10-CM

## 2025-07-10 PROCEDURE — 3008F BODY MASS INDEX DOCD: CPT | Performed by: PHYSICIAN ASSISTANT

## 2025-07-10 PROCEDURE — 99395 PREV VISIT EST AGE 18-39: CPT | Performed by: PHYSICIAN ASSISTANT

## 2025-07-10 ASSESSMENT — ENCOUNTER SYMPTOMS
RESPIRATORY NEGATIVE: 1
DEPRESSION: 0
HEMATOLOGIC/LYMPHATIC NEGATIVE: 1
CARDIOVASCULAR NEGATIVE: 1
LOSS OF SENSATION IN FEET: 0
ENDOCRINE NEGATIVE: 1
CONSTITUTIONAL NEGATIVE: 1
NEUROLOGICAL NEGATIVE: 1
EYES NEGATIVE: 1
ALLERGIC/IMMUNOLOGIC NEGATIVE: 1
GASTROINTESTINAL NEGATIVE: 1
OCCASIONAL FEELINGS OF UNSTEADINESS: 0
PSYCHIATRIC NEGATIVE: 1
MUSCULOSKELETAL NEGATIVE: 1

## 2025-07-10 ASSESSMENT — PAIN SCALES - GENERAL: PAINLEVEL_OUTOF10: 2

## 2025-07-10 ASSESSMENT — PATIENT HEALTH QUESTIONNAIRE - PHQ9
SUM OF ALL RESPONSES TO PHQ9 QUESTIONS 1 AND 2: 0
2. FEELING DOWN, DEPRESSED OR HOPELESS: NOT AT ALL
1. LITTLE INTEREST OR PLEASURE IN DOING THINGS: NOT AT ALL

## 2025-07-10 ASSESSMENT — COLUMBIA-SUICIDE SEVERITY RATING SCALE - C-SSRS
1. IN THE PAST MONTH, HAVE YOU WISHED YOU WERE DEAD OR WISHED YOU COULD GO TO SLEEP AND NOT WAKE UP?: NO
6. HAVE YOU EVER DONE ANYTHING, STARTED TO DO ANYTHING, OR PREPARED TO DO ANYTHING TO END YOUR LIFE?: NO
2. HAVE YOU ACTUALLY HAD ANY THOUGHTS OF KILLING YOURSELF?: NO

## 2025-07-10 NOTE — PATIENT INSTRUCTIONS
Return to clinic in 1 year    If symptoms severely worsen or you experience any new concerning symptoms, go to the nearest emergency room or call 911 as needed.

## 2025-07-10 NOTE — PROGRESS NOTES
"Subjective     Patient ID: Carey Jeong is a 19 y.o. female who presents for Annual Exam.    HPI  Carey Jeong is seen for her chronic issues.  Pt has no concerns at this time,    HSV Suppression  - NO side effects, continue valtrex.     Annual physical  - Labs recently were normal  - Recommended TDAP and Bexsero vaccines today  - Pt will follow up with her GYN for well woman care.      Review of Systems   Constitutional: Negative.    HENT: Negative.     Eyes: Negative.    Respiratory: Negative.     Cardiovascular: Negative.    Gastrointestinal: Negative.    Endocrine: Negative.    Genitourinary: Negative.    Musculoskeletal: Negative.    Skin: Negative.    Allergic/Immunologic: Negative.    Neurological: Negative.    Hematological: Negative.    Psychiatric/Behavioral: Negative.         Objective  Vitals:  /74   Pulse 94   Temp 36.3 °C (97.4 °F)   Ht 1.676 m (5' 6\")   Wt 97.5 kg (215 lb)   SpO2 97%   BMI 34.70 kg/m²     Physical Exam  Vitals and nursing note reviewed.   Constitutional:       General: She is not in acute distress.     Appearance: Normal appearance. She is obese. She is not ill-appearing, toxic-appearing or diaphoretic.   HENT:      Head: Normocephalic and atraumatic.      Right Ear: Tympanic membrane, ear canal and external ear normal. There is no impacted cerumen.      Left Ear: Tympanic membrane, ear canal and external ear normal. There is no impacted cerumen.      Nose: Nose normal. No congestion.      Mouth/Throat:      Mouth: Mucous membranes are moist.      Pharynx: No oropharyngeal exudate or posterior oropharyngeal erythema.   Eyes:      General:         Right eye: No discharge.         Left eye: No discharge.      Extraocular Movements: Extraocular movements intact.      Conjunctiva/sclera: Conjunctivae normal.      Pupils: Pupils are equal, round, and reactive to light.   Neck:      Vascular: No carotid bruit.   Cardiovascular:      Rate and Rhythm: Normal rate and regular rhythm. "      Pulses: Normal pulses.      Heart sounds: Normal heart sounds.   Pulmonary:      Effort: Pulmonary effort is normal. No respiratory distress.      Breath sounds: Normal breath sounds. No stridor. No wheezing, rhonchi or rales.   Chest:      Chest wall: No tenderness.   Abdominal:      General: Bowel sounds are normal. There is no distension.      Palpations: Abdomen is soft. There is no mass.      Tenderness: There is abdominal tenderness (RUQ w/ palpation). There is no right CVA tenderness, left CVA tenderness, guarding or rebound.      Hernia: No hernia is present.   Musculoskeletal:         General: No swelling, tenderness, deformity or signs of injury. Normal range of motion.      Cervical back: Normal range of motion. No rigidity or tenderness.      Right lower leg: No edema.      Left lower leg: No edema.   Lymphadenopathy:      Cervical: No cervical adenopathy.   Skin:     General: Skin is warm.      Capillary Refill: Capillary refill takes less than 2 seconds.      Coloration: Skin is not jaundiced or pale.      Findings: No bruising, erythema, lesion or rash.   Neurological:      General: No focal deficit present.      Mental Status: She is alert and oriented to person, place, and time.      Cranial Nerves: No cranial nerve deficit.      Sensory: No sensory deficit.      Motor: No weakness.      Coordination: Coordination normal.      Gait: Gait normal.      Deep Tendon Reflexes: Reflexes normal.   Psychiatric:         Mood and Affect: Mood normal.         Behavior: Behavior normal.         Thought Content: Thought content normal.         Judgment: Judgment normal.       CBC:   Lab Results   Component Value Date    WBC 9.8 06/27/2025    RBC 5.06 06/27/2025    HGB 14.6 06/27/2025    HCT 45.0 06/27/2025    MCV 88.9 06/27/2025    MCH 28.9 06/27/2025    MCHC 32.4 06/27/2025    RDWS 40.6 06/09/2022    RDWC 13.8 06/09/2022     06/27/2025    MPV 10.4 06/27/2025       CBC w/Diff:   Lab Results  "  Component Value Date    WBC 9.8 06/27/2025    NRBC 0.0 01/16/2023    RBC 5.06 06/27/2025    HGB 14.6 06/27/2025    HCT 45.0 06/27/2025    MCV 88.9 06/27/2025    MCHC 32.4 06/27/2025     06/27/2025    RDW 13.5 06/27/2025    NEUTOPHILPCT 68.1 01/16/2023    IGPCT 0.3 01/16/2023    LYMPHOPCT 31.7 06/27/2025    MONOPCT 11.7 06/27/2025    EOSPCT 0.8 06/27/2025    BASOPCT 0.5 06/27/2025    NEUTROABS 8.06 (H) 01/16/2023    LYMPHSABS 2.36 01/16/2023    MONOSABS 1.24 (H) 01/16/2023    EOSABS 78 06/27/2025    BASOSABS 49 06/27/2025        CMP:  Lab Results   Component Value Date    GLUCOSE 84 06/27/2025     06/27/2025    K 4.4 06/27/2025     06/27/2025    CO2 27 06/27/2025    ANIONGAP 10 06/27/2025    BUN 17 06/27/2025    CREATININE 0.70 06/27/2025    GFRF >90 07/01/2022    CALCIUM 9.1 06/27/2025    ALBUMIN 4.6 06/27/2025    ALKPHOS 67 06/27/2025    PROT 7.2 06/27/2025    AST 20 06/27/2025    BILITOT 0.4 06/27/2025    ALT 20 06/27/2025        BMP:  Lab Results   Component Value Date    GLUCOSE 84 06/27/2025    BUN 17 06/27/2025    CREATININE 0.70 06/27/2025    UREACREAUR 10.0 06/09/2022     06/27/2025    K 4.4 06/27/2025     06/27/2025    CO2 27 06/27/2025    ANIONGAP 10 06/27/2025    CALCIUM 9.1 06/27/2025    EGFR 126 06/27/2025        Lipid:   Lab Results   Component Value Date    CHOL 171 06/27/2025    HDL 62 06/27/2025    CHHDL 2.8 06/27/2025    LDLCALC 91 06/27/2025    VLDL 18 08/12/2020    TRIG 89 06/27/2025       LDL Direct:  No results found for: \"LDLDIRECT\"     TSH:   Lab Results   Component Value Date    TSH 1.04 06/27/2025        B12:  No results found for: \"MPVZNAEP91\"    Vitamin D:  Lab Results   Component Value Date    VITD25 32 08/12/2020        HgA1c:   Lab Results   Component Value Date    HGBA1C 5.0 10/01/2019       PSA:   No results found for: \"PSA\"    FreeT4:  No results found for: \"FREET4\"    T3:   No results found for: \"T3FREE\"      9/2023 RUQ US  US ABDOMEN " COMPLETE  Order: [id number]  Impression        No significant abdominal ultrasound abnormality.  Narrative    HISTORY: Unspecified abdominal pain  COMPARISON: Abdomen/pelvis CT 12/6/2022  NOTE:  If there are questions about the content of this report, please [facility] by calling [phone]    FINDINGS:  PANCREAS:  Unremarkable  AORTA:  Unremarkable  IVC:  Unremarkable  LIVER:  Unremarkable  GALLBLADDER:  No gallstones, wall thickening, or pericholecystic fluid. Sonographic Chang's sign reported as negative.  BILE DUCTS:  Unremarkable.  No intra or extrahepatic biliary dilation  COMMON DUCT MEASUREMENT:  5 mm  RIGHT KIDNEY:  Unremarkable  LEFT KIDNEY:  Unremarkable  SPLEEN:  Unremarkable  OTHER:  None  Exam End: 09/26/23 08:03    Specimen Collected: 09/26/23 08:19 Last Resulted: 09/26/23 08:22     Received From: [facility]          1. Annual physical exam     Pt declines vaccinations at this time, gyn for well woman exams.   2. EDS (Celi-Danlos syndrome) ([facility])        3. Bipolar disorder, current episode manic without psychotic features, mild (Multi)        4. Major depressive disorder, recurrent severe without psychotic features (Multi)        5. Asthma, unspecified asthma severity, unspecified whether complicated, unspecified whether persistent ([facility])     Stable   6. RUQ pain     Moderate RUQ pain w/ palpation. Offered RUQ US/workup. Pt declined. States she had this happen before and workup was negative in 2023. Discussed if she ever changes her mind to let me know.     Return to clinic in 1 year    If symptoms severely worsen or you experience any new concerning symptoms, go to the nearest emergency room or call 911 as needed.

## 2025-07-11 DIAGNOSIS — Z23 NEED FOR VACCINATION: ICD-10-CM

## 2025-07-17 ENCOUNTER — CLINICAL SUPPORT (OUTPATIENT)
Dept: PRIMARY CARE | Facility: CLINIC | Age: 20
End: 2025-07-17
Payer: COMMERCIAL

## 2025-07-17 DIAGNOSIS — Z23 NEED FOR VACCINATION: ICD-10-CM

## 2025-07-17 PROCEDURE — 90471 IMMUNIZATION ADMIN: CPT | Performed by: INTERNAL MEDICINE

## 2025-07-17 PROCEDURE — 90620 MENB-4C VACCINE IM: CPT | Performed by: INTERNAL MEDICINE

## 2025-09-19 ENCOUNTER — APPOINTMENT (OUTPATIENT)
Dept: PRIMARY CARE | Facility: CLINIC | Age: 20
End: 2025-09-19
Payer: COMMERCIAL